# Patient Record
Sex: FEMALE | Race: WHITE | URBAN - METROPOLITAN AREA
[De-identification: names, ages, dates, MRNs, and addresses within clinical notes are randomized per-mention and may not be internally consistent; named-entity substitution may affect disease eponyms.]

---

## 2023-08-31 ENCOUNTER — OFFICE (OUTPATIENT)
Dept: URBAN - METROPOLITAN AREA CLINIC 105 | Facility: CLINIC | Age: 68
End: 2023-08-31
Payer: COMMERCIAL

## 2023-08-31 VITALS
OXYGEN SATURATION: 96 % | HEART RATE: 73 BPM | DIASTOLIC BLOOD PRESSURE: 70 MMHG | SYSTOLIC BLOOD PRESSURE: 115 MMHG | WEIGHT: 143 LBS | HEIGHT: 61 IN

## 2023-08-31 DIAGNOSIS — R11.0 NAUSEA: ICD-10-CM

## 2023-08-31 DIAGNOSIS — K76.0 FATTY (CHANGE OF) LIVER, NOT ELSEWHERE CLASSIFIED: ICD-10-CM

## 2023-08-31 DIAGNOSIS — R10.10 UPPER ABDOMINAL PAIN, UNSPECIFIED: ICD-10-CM

## 2023-08-31 DIAGNOSIS — K58.0 IRRITABLE BOWEL SYNDROME WITH DIARRHEA: ICD-10-CM

## 2023-08-31 DIAGNOSIS — R79.89 OTHER SPECIFIED ABNORMAL FINDINGS OF BLOOD CHEMISTRY: ICD-10-CM

## 2023-08-31 DIAGNOSIS — K21.9 GASTRO-ESOPHAGEAL REFLUX DISEASE WITHOUT ESOPHAGITIS: ICD-10-CM

## 2023-08-31 DIAGNOSIS — Z86.010 PERSONAL HISTORY OF COLONIC POLYPS: ICD-10-CM

## 2023-08-31 DIAGNOSIS — R14.0 ABDOMINAL DISTENSION (GASEOUS): ICD-10-CM

## 2023-08-31 PROCEDURE — 99204 OFFICE O/P NEW MOD 45 MIN: CPT

## 2023-08-31 RX ORDER — HYOSCYAMINE SULFATE 0.12 MG/1
0.38 TABLET ORAL; SUBLINGUAL
Qty: 30 | Refills: 2 | Status: ACTIVE
Start: 2023-08-31

## 2023-08-31 RX ORDER — SODIUM PICOSULFATE, MAGNESIUM OXIDE, AND ANHYDROUS CITRIC ACID 12; 3.5; 1 G/175ML; G/175ML; MG/175ML
LIQUID ORAL
Qty: 2 | Refills: 0 | Status: ACTIVE
Start: 2023-08-31

## 2023-08-31 NOTE — SERVICENOTES
– Ordered CT abdomen/pelvis
– Ordered upper endoscopy and colonoscopy
– Sent in prescription for hyoscyamine under the tongue every 8 hours as needed for abdominal pain
– Try low FODMAP diet for bloating
– Continue omeprazole 40 mg daily, 30 to 60 minutes before breakfast for acid reflux

## 2023-08-31 NOTE — SERVICEHPINOTES
Mya Sealsn   is seen for an initial visit today.    
br
brPatient presents for evaluation of abdominal pain. Patient was seen by her PCP–Dr. Sandra Jaramillo 8/15/2023. She has a history of elevated liver enzymes, fatty liver, GERD and colon polyps. Had a colonoscopy 6/2019 with Dr. Hurd with polyps and was advised to repeat her colonoscopy in 3 years. Patient had a KUB 8/24/2023 with a single loop of mildly distended small bowel in the left upper quadrant representing a focal ileus, no convincing bowel obstruction. Patient had a repeat KUB 8/28/2023 with moderate air-filled stomach. 
br
brReviewed labs from 8/20/2023. CBC normal. CMP with sodium 133, creatinine 108, AST 48 and ALT 75 patient had FibroSure 6/2023 with S1 steatosis and F0 fibrosis. Patient previously had negative AMA and anti-smooth muscle antibody 5/2020. Celiac panel negative. Hep A nonimmune. Hep B immune. Hep C negative. Patient had an ultrasound 1/2023 with fatty liver. Patient had a CTA of the chest 3/2022 with no PE and + gastroesophageal reflux. Patient has CT abdomen/pelvis 2/2021 with fluid in the colon concerning for mild enterocolitis, status post cholecystectomy without biliary ductal dilatation, small hiatal hernia and subcentimeter low-density lesions in the left hepatic lobe and right kidney that are likely cysts.
br
brPatient had a colonoscopy 6/2019 with 3 polyps removed and 2 clips placed in the cecum for bleeding. She was advised to repeat her colonoscopy in 3 years. She has a prior history of IBS for which she has been on nortriptyline and VSL #3. Patient has CT abdomen/pelvis 6/2019 with no acute inflammatory process, mild hepatic steatosis, 2 clips in the cecum, status post cholecystectomy and hysterectomy and small hiatal hernia.
br
brPatient states that she is recovering from recent asthma attack for which she was treated with albuterol and steroids. COVID and flu negative. Last week she started developing upper abdominal pain that radiated to her back. She tried taking a laxative a few days ago but continued to have pain. Also with associated bloating. She denies blood in stool or black stool. She reports having issues with low sodium. She has had lack of desire to eat. Also with associated nausea but no vomiting. She previously had her gallbladder removed a few years back and since then has had diarrhea, typically in the morning. She takes omeprazole as needed. She denies dysphagia. She reports being vaccinated for hepatitis A. She occasionally has acid reflux. She tried IBgard without improvement in her symptoms. She denies family history of colon cancer or inflammatory bowel disease. She denies family history of liver disease.br  br

## 2025-08-12 ENCOUNTER — APPOINTMENT (OUTPATIENT)
Dept: URBAN - NONMETROPOLITAN AREA SURGERY 5 | Age: 70
Setting detail: DERMATOLOGY
End: 2025-08-28

## 2025-08-12 DIAGNOSIS — D18.0 HEMANGIOMA: ICD-10-CM

## 2025-08-12 DIAGNOSIS — B07.8 OTHER VIRAL WARTS: ICD-10-CM

## 2025-08-12 DIAGNOSIS — L82.1 OTHER SEBORRHEIC KERATOSIS: ICD-10-CM

## 2025-08-12 DIAGNOSIS — L82.0 INFLAMED SEBORRHEIC KERATOSIS: ICD-10-CM

## 2025-08-12 DIAGNOSIS — D22 MELANOCYTIC NEVI: ICD-10-CM

## 2025-08-12 DIAGNOSIS — L57.0 ACTINIC KERATOSIS: ICD-10-CM

## 2025-08-12 PROBLEM — D18.01 HEMANGIOMA OF SKIN AND SUBCUTANEOUS TISSUE: Status: ACTIVE | Noted: 2025-08-12

## 2025-08-12 PROBLEM — D48.5 NEOPLASM OF UNCERTAIN BEHAVIOR OF SKIN: Status: ACTIVE | Noted: 2025-08-12

## 2025-08-12 PROCEDURE — 17003 DESTRUCT PREMALG LES 2-14: CPT | Mod: 59

## 2025-08-12 PROCEDURE — OTHER PRESCRIPTION: OTHER

## 2025-08-12 PROCEDURE — OTHER LIQUID NITROGEN: OTHER

## 2025-08-12 PROCEDURE — OTHER BIOPSY BY SHAVE METHOD: OTHER

## 2025-08-12 PROCEDURE — 17110 DESTRUCT B9 LESION 1-14: CPT

## 2025-08-12 PROCEDURE — OTHER COUNSELING: OTHER

## 2025-08-12 PROCEDURE — 99203 OFFICE O/P NEW LOW 30 MIN: CPT | Mod: 25

## 2025-08-12 PROCEDURE — 17000 DESTRUCT PREMALG LESION: CPT | Mod: 59

## 2025-08-12 PROCEDURE — OTHER MIPS QUALITY: OTHER

## 2025-08-12 PROCEDURE — 11102 TANGNTL BX SKIN SINGLE LES: CPT | Mod: 59

## 2025-08-12 RX ORDER — PHARMACY COMPOUNDING ACCESSORY
EACH MISCELLANEOUS
Qty: 2 | Refills: 3 | Status: ERX | COMMUNITY
Start: 2025-08-12

## 2025-08-12 ASSESSMENT — LOCATION DETAILED DESCRIPTION DERM
LOCATION DETAILED: LEFT LATERAL SUPERIOR CHEST
LOCATION DETAILED: RIGHT PROXIMAL DORSAL FOREARM
LOCATION DETAILED: INFERIOR THORACIC SPINE
LOCATION DETAILED: SUPERIOR THORACIC SPINE
LOCATION DETAILED: RIGHT ANTERIOR PROXIMAL UPPER ARM
LOCATION DETAILED: LEFT KNEE
LOCATION DETAILED: RIGHT DISTAL ULNAR DORSAL FOREARM
LOCATION DETAILED: RIGHT DISTAL PRETIBIAL REGION
LOCATION DETAILED: LEFT UPPER CUTANEOUS LIP
LOCATION DETAILED: LEFT PROXIMAL DORSAL FOREARM

## 2025-08-12 ASSESSMENT — LOCATION ZONE DERM
LOCATION ZONE: LEG
LOCATION ZONE: TRUNK
LOCATION ZONE: LIP
LOCATION ZONE: ARM

## 2025-08-12 ASSESSMENT — LOCATION SIMPLE DESCRIPTION DERM
LOCATION SIMPLE: RIGHT FOREARM
LOCATION SIMPLE: LEFT LIP
LOCATION SIMPLE: CHEST
LOCATION SIMPLE: UPPER BACK
LOCATION SIMPLE: RIGHT UPPER ARM
LOCATION SIMPLE: RIGHT PRETIBIAL REGION
LOCATION SIMPLE: LEFT KNEE
LOCATION SIMPLE: LEFT FOREARM